# Patient Record
(demographics unavailable — no encounter records)

---

## 2024-10-16 NOTE — PHYSICAL EXAM
[JVD] : no jugular venous distention  [Normal Breath Sounds] : Normal breath sounds [Normal Rate and Rhythm] : normal rate and rhythm [2+] : left 2+ [Ankle Swelling (On Exam)] : present [Ankle Swelling Bilaterally] : bilaterally  [Ankle Swelling On The Left] : moderate [Varicose Veins Of Lower Extremities] : present [Varicose Veins Of The Left Leg] : of the left leg [] : bilaterally [Ankle Swelling On The Right] : mild [de-identified] : well appearing [de-identified] : wnl [FreeTextEntry1] : LLE: CEAP C-4 swelling skin darkening and varicose veins RLE: milder but still with swelling and mild skin darkening CEAP C-4

## 2024-10-16 NOTE — HISTORY OF PRESENT ILLNESS
[FreeTextEntry1] : Ms. ALISHA NATHAN is a 71 year F  who presents for initial  evaluation of _bilateral leg pain for the past _>12__ months.  Ms. NATHAN leg discomfort is associated with leg swelling, fatigue, heaviness, achiness, restlessness, muscle cramping, and skin darkening at the ankles.  She complains of  _new left calf__ painful varicose veins. Her symptoms have persisted despite conservative management with ___leg elevation, exercise, attempted weight loss (50lb), and compression stocking use for more than 3 months. Her symptoms are aggravated by weight bearing, prolonged standing, and prolonged sitting. Her symptoms are alleviated by wearing compression stockings and leg elevation.  Her symptoms interfere with the her ADLs such as work, shopping and housekeeping.   She works and sits  for prolonged periods of time with the inability to take frequent breaks to elevate their legs.  Ms. NATHAN denies history of DVT/SVT/PE or other thromboembolic disease. She denies history of lower extremity claudication, rest pain, or tissue loss. She denies CAD, MI, DM, CRI, CVA, or TIA.  PMH significant for HTN, OA, RA
